# Patient Record
Sex: FEMALE | Race: WHITE | URBAN - METROPOLITAN AREA
[De-identification: names, ages, dates, MRNs, and addresses within clinical notes are randomized per-mention and may not be internally consistent; named-entity substitution may affect disease eponyms.]

---

## 2018-08-07 ENCOUNTER — APPOINTMENT (RX ONLY)
Dept: URBAN - METROPOLITAN AREA CLINIC 31 | Facility: CLINIC | Age: 28
Setting detail: DERMATOLOGY
End: 2018-08-07

## 2018-08-07 DIAGNOSIS — L98.1 FACTITIAL DERMATITIS: ICD-10-CM

## 2018-08-07 DIAGNOSIS — L81.0 POSTINFLAMMATORY HYPERPIGMENTATION: ICD-10-CM

## 2018-08-07 DIAGNOSIS — L28.0 LICHEN SIMPLEX CHRONICUS: ICD-10-CM

## 2018-08-07 PROBLEM — L30.9 DERMATITIS, UNSPECIFIED: Status: ACTIVE | Noted: 2018-08-07

## 2018-08-07 PROBLEM — F41.9 ANXIETY DISORDER, UNSPECIFIED: Status: ACTIVE | Noted: 2018-08-07

## 2018-08-07 PROBLEM — R23.3 SPONTANEOUS ECCHYMOSES: Status: ACTIVE | Noted: 2018-08-07

## 2018-08-07 PROCEDURE — ? COUNSELING

## 2018-08-07 PROCEDURE — ? PRESCRIPTION

## 2018-08-07 PROCEDURE — 11100: CPT

## 2018-08-07 PROCEDURE — ? BIOPSY BY SHAVE METHOD

## 2018-08-07 PROCEDURE — ? TREATMENT REGIMEN

## 2018-08-07 PROCEDURE — 99202 OFFICE O/P NEW SF 15 MIN: CPT | Mod: 25

## 2018-08-07 RX ORDER — CLOBETASOL PROPIONATE 0.5 MG/G
OINTMENT TOPICAL
Qty: 1 | Refills: 0 | Status: ERX | COMMUNITY
Start: 2018-08-07

## 2018-08-07 RX ADMIN — CLOBETASOL PROPIONATE: 0.5 OINTMENT TOPICAL at 18:17

## 2018-08-07 ASSESSMENT — LOCATION SIMPLE DESCRIPTION DERM
LOCATION SIMPLE: LEFT PRETIBIAL REGION
LOCATION SIMPLE: RIGHT PRETIBIAL REGION

## 2018-08-07 ASSESSMENT — LOCATION ZONE DERM: LOCATION ZONE: LEG

## 2018-08-07 ASSESSMENT — LOCATION DETAILED DESCRIPTION DERM
LOCATION DETAILED: LEFT PROXIMAL PRETIBIAL REGION
LOCATION DETAILED: RIGHT PROXIMAL PRETIBIAL REGION
LOCATION DETAILED: LEFT DISTAL PRETIBIAL REGION

## 2018-08-07 NOTE — PROCEDURE: BIOPSY BY SHAVE METHOD
Cryotherapy Text: The wound bed was treated with cryotherapy after the biopsy was performed.
Anesthesia Type: 1% lidocaine with epinephrine
Wound Care: Petrolatum
Lab: -20
Dressing: bandage
Destruction After The Procedure: No
Biopsy Type: H and E
Type Of Destruction Used: Curettage
Hemostasis: Drysol
Biopsy Method: double edge Personna blade
Additional Anesthesia Volume In Cc (Will Not Render If 0): 0
Depth Of Biopsy: dermis
Post-Care Instructions: I reviewed with the patient in detail post-care instructions. Patient is to keep the biopsy covered and apply vaseline twice daily until healed.
Electrodesiccation And Curettage Text: The wound bed was treated with electrodesiccation and curettage after the biopsy was performed.
Silver Nitrate Text: The wound bed was treated with silver nitrate after the biopsy was performed.
Lab Facility: [id number]
Billing Type: Third-Party Bill
Detail Level: Detailed
Curettage Text: The wound bed was treated with curettage after the biopsy was performed.
Was A Bandage Applied: Yes
Consent: Written consent was obtained and risks were reviewed including but not limited to scarring, infection, bleeding, scabbing, incomplete removal, nerve damage and allergy to anesthesia.
Notification Instructions: Patient will be notified of biopsy results. However, patient instructed to call the office if not contacted within 2 weeks.
Electrodesiccation Text: The wound bed was treated with electrodesiccation after the biopsy was performed.
Anesthesia Volume In Cc (Will Not Render If 0): 0.3

## 2018-08-07 NOTE — HPI: EVALUATION OF SKIN LESION(S)
Hpi Title: Evaluation of Skin Lesions
How Severe Are Your Spot(S)?: moderate
Have Your Spot(S) Been Treated In The Past?: has not been treated
Hpi Title: Evaluation of a Skin Lesion
How Severe Are Your Spot(S)?: mild

## 2018-11-14 ENCOUNTER — APPOINTMENT (RX ONLY)
Dept: URBAN - METROPOLITAN AREA CLINIC 31 | Facility: CLINIC | Age: 28
Setting detail: DERMATOLOGY
End: 2018-11-14

## 2018-11-14 DIAGNOSIS — L28.0 LICHEN SIMPLEX CHRONICUS: ICD-10-CM | Status: RESOLVING

## 2018-11-14 DIAGNOSIS — L43.2 LICHENOID DRUG REACTION: ICD-10-CM | Status: RESOLVING

## 2018-11-14 PROCEDURE — ? PRESCRIPTION

## 2018-11-14 PROCEDURE — 99213 OFFICE O/P EST LOW 20 MIN: CPT

## 2018-11-14 PROCEDURE — ? COUNSELING

## 2018-11-14 RX ORDER — POLYDIMETHYLSILOXANES/SILICON
GEL (GRAM) TOPICAL
Qty: 1 | Refills: 5 | COMMUNITY
Start: 2018-11-14

## 2018-11-14 RX ADMIN — Medication: at 22:56

## 2021-04-01 ENCOUNTER — ROUTINE PRENATAL (OUTPATIENT)
Dept: OBGYN CLINIC | Facility: CLINIC | Age: 31
End: 2021-04-01

## 2021-04-01 VITALS
SYSTOLIC BLOOD PRESSURE: 122 MMHG | BODY MASS INDEX: 35.31 KG/M2 | HEIGHT: 67 IN | DIASTOLIC BLOOD PRESSURE: 74 MMHG | WEIGHT: 225 LBS

## 2021-04-01 DIAGNOSIS — Z36.85 ANTENATAL SCREENING FOR STREPTOCOCCUS B: Primary | ICD-10-CM

## 2021-04-01 PROCEDURE — PNV: Performed by: OBSTETRICS & GYNECOLOGY

## 2021-04-01 NOTE — PROGRESS NOTES
Pt feels well  Had growth U/S done yesterday, results pending    GBS culture done and delivery consent obtained

## 2021-04-03 LAB — GP B STREP DNA SPEC QL NAA+PROBE: NEGATIVE

## 2021-04-08 ENCOUNTER — ROUTINE PRENATAL (OUTPATIENT)
Dept: OBGYN CLINIC | Facility: CLINIC | Age: 31
End: 2021-04-08

## 2021-04-08 VITALS
HEIGHT: 68 IN | DIASTOLIC BLOOD PRESSURE: 70 MMHG | BODY MASS INDEX: 34.71 KG/M2 | SYSTOLIC BLOOD PRESSURE: 110 MMHG | WEIGHT: 229 LBS

## 2021-04-08 DIAGNOSIS — Z3A.37 37 WEEKS GESTATION OF PREGNANCY: Primary | ICD-10-CM

## 2021-04-08 LAB
SL AMB  POCT GLUCOSE, UA: NORMAL
SL AMB POCT URINE PROTEIN: NORMAL

## 2021-04-08 PROCEDURE — PNV: Performed by: OBSTETRICS & GYNECOLOGY

## 2021-04-08 NOTE — PROGRESS NOTES
Routine Prenatal Visit  [address], [address], [facility], [address] [facility]    Assessment/Plan:  Jaspal Escobar is a 31y o  year old  at Unknown who presents for routine prenatal visit  1  37 weeks gestation of pregnancy  -     POCT urine dip    Informed pt of negative GBS results  Subjective:     CC: Prenatal care    Hipolito Talbert is a 31 y o  Beauty Asa female who presents for routine prenatal care at Unknown  Pregnancy ROS: No leakage of fluid, pelvic pain, or vaginal bleeding  Nml fetal movement  The following portions of the patient's history were reviewed and updated as appropriate: allergies, current medications, past family history, past medical history, obstetric history, gynecologic history, past social history, past surgical history and problem list       Objective:  /70   Ht 5' 8" (1 727 m)   Wt 104 kg (229 lb)   LMP 2020 (Exact Date)   BMI 34 82 kg/m²   Pregravid Weight/BMI: Pregravid weight not on file (BMI Could not be calculated)  Current Weight: 104 kg (229 lb)   Total Weight Gain: Not found  Pre- Vitals      Most Recent Value   Prenatal Assessment   Fetal Heart Rate  150   Fundal Height (cm)  38 cm   Movement  Present   Presentation  Vertex   Prenatal Vitals   Blood Pressure  110/70   Weight - Scale  104 kg (229 lb)   Urine Albumin/Glucose   Dilation/Effacement/Station   Cervical Dilation  0   Cervical Effacement  20   Fetal Station  -3   Vaginal Drainage   Draining Fluid  No   Edema           General: Well appearing, no distress  Abdomen: Soft, gravid, nontender  Fundal Height: Appropriate for gestational age  Extremities: Warm and well perfused  Non tender

## 2021-04-15 ENCOUNTER — DOCUMENTATION (OUTPATIENT)
Dept: OBGYN CLINIC | Facility: CLINIC | Age: 31
End: 2021-04-15

## 2021-04-15 ENCOUNTER — ROUTINE PRENATAL (OUTPATIENT)
Dept: OBGYN CLINIC | Facility: CLINIC | Age: 31
End: 2021-04-15
Payer: COMMERCIAL

## 2021-04-15 VITALS
WEIGHT: 230 LBS | SYSTOLIC BLOOD PRESSURE: 114 MMHG | BODY MASS INDEX: 34.86 KG/M2 | HEIGHT: 68 IN | DIASTOLIC BLOOD PRESSURE: 74 MMHG

## 2021-04-15 DIAGNOSIS — E66.09 CLASS 1 OBESITY DUE TO EXCESS CALORIES WITHOUT SERIOUS COMORBIDITY WITH BODY MASS INDEX (BMI) OF 33.0 TO 33.9 IN ADULT: ICD-10-CM

## 2021-04-15 DIAGNOSIS — Z34.93 PRENATAL CARE IN THIRD TRIMESTER: ICD-10-CM

## 2021-04-15 DIAGNOSIS — F41.9 ANXIETY: ICD-10-CM

## 2021-04-15 DIAGNOSIS — Z3A.38 38 WEEKS GESTATION OF PREGNANCY: Primary | ICD-10-CM

## 2021-04-15 LAB
SL AMB  POCT GLUCOSE, UA: NORMAL
SL AMB POCT URINE PROTEIN: NORMAL

## 2021-04-15 PROCEDURE — PNV: Performed by: OBSTETRICS & GYNECOLOGY

## 2021-04-15 NOTE — PROGRESS NOTES
Routine Prenatal Visit  [address], [address], [facility], [address] [facility]    Assessment/Plan:  Yonathan Last is a 31y o  year old  at 38w3d who presents for routine prenatal visit  1  38 weeks gestation of pregnancy  -     POCT urine dip  Cervix 4cm/60/0, favorable for IOL  Pt desires IOL, scheduled for 21  Si/sx of labor reviewed  2  Anxiety    3  Class 1 obesity due to excess calories without serious comorbidity with body mass index (BMI) of 33 0 to 33 9 in adult    4  Prenatal care in third trimester    Si/Sx of labor reviewed      Subjective:     CC: Prenatal care    Garrison Martinez is a 31 y o   female who presents for routine prenatal care at 38w3d  Pregnancy ROS: No leakage of fluid, pelvic pain, or vaginal bleeding  Nml fetal movement  The following portions of the patient's history were reviewed and updated as appropriate: allergies, current medications, past family history, past medical history, obstetric history, gynecologic history, past social history, past surgical history and problem list       Objective:  /74   Ht 5' 8" (1 727 m)   Wt 104 kg (230 lb)   LMP 2020 (Exact Date)   BMI 34 97 kg/m²   Pregravid Weight/BMI: Pregravid weight not on file (BMI Could not be calculated)  Current Weight: 104 kg (230 lb)   Total Weight Gain: Not found  Pre- Vitals      Most Recent Value   Prenatal Assessment   Fetal Heart Rate  140   Fundal Height (cm)  39 cm   Movement  Present   Presentation  Vertex   Prenatal Vitals   Blood Pressure  114/74   Weight - Scale  104 kg (230 lb)   Urine Albumin/Glucose   Dilation/Effacement/Station   Cervical Dilation  4   Cervical Effacement  60   Fetal Station  0   Vaginal Drainage   Draining Fluid  No   Edema           General: Well appearing, no distress  Abdomen: Soft, gravid, nontender  Fundal Height: Appropriate for gestational age  Extremities: Warm and well perfused  Non tender

## 2021-04-16 NOTE — PROGRESS NOTES
4/15/2021 Vaginal delivery, 2nd degree laceration      Precipitous delivery, pt did not get epidural

## 2021-04-21 ENCOUNTER — TELEPHONE (OUTPATIENT)
Dept: OBGYN CLINIC | Facility: CLINIC | Age: 31
End: 2021-04-21

## 2021-04-21 NOTE — TELEPHONE ENCOUNTER
Reviewed patient concerns with Dr Abhishek Massey who recommends patient proceed to ER for evaluation due to severity of pain and limited movement ability  Returned call to patient advised to proceed to ED or urgent care  Patient in agreement   Requested call back with update

## 2021-04-21 NOTE — TELEPHONE ENCOUNTER
Sasha Felipe calling with concerns of pain under right axillae extends straight down to bra strap area  Pain is constant, increased pain with palpation  Unable to lift right arm without increased discomfort  Denies redness, warmth to touch, fever  She is not breast feeding or pumping reporting milk has not come in  Has no breast pain or tenderness  Denies recent covid vaccine  Pain level is 10  Also c/o increasing vaginal discomfort however reports she has been very active and going up and down stairs frequently  No increased vaginal bleeding or discharge  Encouraged to rest when baby sleeps  Limit activity and use of stairs  Continue to monitor vaginal discomfort and c/b with any increase of symptoms  Will review right axillary pain with Dr Maximilian Diaz

## 2021-04-22 NOTE — TELEPHONE ENCOUNTER
Review of [facility] shows pt presented to urgent care - dx with cellulitis vs early abscess on back and under axilla  Started on Abx and recom warm compresses  Please print from [address] and scan to chart

## 2021-05-24 ENCOUNTER — POSTPARTUM VISIT (OUTPATIENT)
Dept: OBGYN CLINIC | Facility: CLINIC | Age: 31
End: 2021-05-24

## 2021-05-24 VITALS
HEIGHT: 66 IN | BODY MASS INDEX: 33.43 KG/M2 | WEIGHT: 208 LBS | DIASTOLIC BLOOD PRESSURE: 58 MMHG | SYSTOLIC BLOOD PRESSURE: 98 MMHG

## 2021-05-24 PROCEDURE — 99024 POSTOP FOLLOW-UP VISIT: CPT | Performed by: OBSTETRICS & GYNECOLOGY

## 2021-05-24 NOTE — PATIENT INSTRUCTIONS
- Okay to start exercising again  Start slowly and increase with time      - No lifting restrictions      - Okay to resume intercourse, be sure to use contraception  It is possible to get pregnant before your first period  - If breast feeding, periods may not return right away

## 2021-05-24 NOTE — PROGRESS NOTES
Routine Post Partum Visit  [address] [facility], [address], [facility], [address] [address]    Assessment/Plan:  Schuyler Wright is a 31y o  year old  who presents for postpartum visit  Routine Postpartum Care  1  Normal postpartum exam  2  Contraception: condoms  3  Depression Screen: PHQ9 - zero  4  Feeding: bottle  5  Psychosocial support: good  6  Cervical cancer screening Up to Date, next due this year  7  Follow up in: 3 months or as needed  Additional Problems:  1  Postpartum care and examination        Next visit: 3 months Wellness    Subjective:     CC: Postpartum visit    Melba Rowe is a 31 y o  y o  female  who presents for a postpartum visit  She is 6 weeks postpartum following a spontaneous vaginal delivery on 4/15/2021 at 38 weeks  Outcome: spontaneous vaginal delivery, 2nd degree  Anesthesia: none - too quick for epidural   Postpartum course has been complicated by right axilla cellulitis 1 week post partum for which she was treated w/ Abx  Baby's course has been uncomplciated  Baby is feeding by bottle  Bleeding no bleeding currently - bleeding had stopped and then last week likely a period  Bowel function is normal  Bladder function is normal  Patient is not sexually active since delivery  Contraception method is condoms  Postpartum depression screening: negative      The following portions of the patient's history were reviewed and updated as appropriate: allergies, current medications, past family history, past medical history, obstetric history, gynecologic history, past social history, past surgical history and problem list       Objective:  BP 98/58 (BP Location: Left arm, Patient Position: Sitting, Cuff Size: Large)   Ht 5' 5 75" (1 67 m)   Wt 94 3 kg (208 lb)   LMP 2020 (Exact Date)   Breastfeeding No   BMI 33 83 kg/m²   Pregravid Weight/BMI: Pregravid weight not on file (BMI Could not be calculated)  Current Weight: 94 3 kg (208 lb)   Total Weight Gain: Not found  General: Well appearing, no distress  Mood and affect: Appropriate    Abdomen: Soft, nontender  Incision: well healed  Vulva: normal  Vagina: normal, no abnormal discharge  Cervix: closed, no bleeding  Uterus: non-tender, normal size  Adnexa: no masses, no tenderness

## 2021-10-04 ENCOUNTER — ANNUAL EXAM (OUTPATIENT)
Dept: OBGYN CLINIC | Facility: CLINIC | Age: 31
End: 2021-10-04
Payer: COMMERCIAL

## 2021-10-04 VITALS
HEIGHT: 66 IN | DIASTOLIC BLOOD PRESSURE: 60 MMHG | WEIGHT: 200.2 LBS | BODY MASS INDEX: 32.17 KG/M2 | SYSTOLIC BLOOD PRESSURE: 102 MMHG

## 2021-10-04 DIAGNOSIS — Z30.09 ENCOUNTER FOR COUNSELING REGARDING CONTRACEPTION: ICD-10-CM

## 2021-10-04 DIAGNOSIS — F41.9 ANXIETY: ICD-10-CM

## 2021-10-04 DIAGNOSIS — Z01.419 ENCOUNTER FOR ANNUAL ROUTINE GYNECOLOGICAL EXAMINATION: Primary | ICD-10-CM

## 2021-10-04 DIAGNOSIS — Z12.4 CERVICAL CANCER SCREENING: ICD-10-CM

## 2021-10-04 PROCEDURE — S0612 ANNUAL GYNECOLOGICAL EXAMINA: HCPCS | Performed by: OBSTETRICS & GYNECOLOGY

## 2021-10-07 LAB
CYTOLOGIST CVX/VAG CYTO: NORMAL
DX ICD CODE: NORMAL
OTHER STN SPEC: NORMAL
OTHER STN SPEC: NORMAL
PATH REPORT.FINAL DX SPEC: NORMAL
SL AMB NOTE:: NORMAL
SL AMB SPECIMEN ADEQUACY: NORMAL
SL AMB TEST METHODOLOGY: NORMAL

## 2021-10-11 ENCOUNTER — TELEPHONE (OUTPATIENT)
Dept: OBGYN CLINIC | Facility: CLINIC | Age: 31
End: 2021-10-11

## 2022-03-21 ENCOUNTER — TELEPHONE (OUTPATIENT)
Dept: OBGYN CLINIC | Facility: CLINIC | Age: 32
End: 2022-03-21

## 2022-03-21 DIAGNOSIS — F41.9 ANXIETY: Primary | ICD-10-CM

## 2022-03-21 NOTE — TELEPHONE ENCOUNTER
Return call to Jon Taylor, she started the zoloft in February  Currently has 25 mg tablets, takes 2 daily  Her current bottle indicates partial refill available  She had forgotten about scheduling a follow up     to c/b to schedule appt with Dr Larisa Watson

## 2022-03-21 NOTE — TELEPHONE ENCOUNTER
Patient l/m 03/17/22 @ 2:29 pm (office had phone issues and did not receive the voice message on nurse's line until 03/18/22 after 4 pm) requesting refill of Zoloft  Previous office note from Dr Mora Taylor states patient received a script for Zoloft post partum but did not start but she felt more anxious now and will start the Zoloft 10/04/21 and supposed to f/u in 1 month (she never followed-up)

## 2022-03-31 RX ORDER — SERTRALINE HYDROCHLORIDE 25 MG/1
50 TABLET, FILM COATED ORAL DAILY
Qty: 60 TABLET | Refills: 0 | Status: SHIPPED | OUTPATIENT
Start: 2022-03-31 | End: 2022-04-27 | Stop reason: SDUPTHER

## 2022-03-31 RX ORDER — SERTRALINE HYDROCHLORIDE 25 MG/1
TABLET, FILM COATED ORAL
COMMUNITY
Start: 2022-02-15 | End: 2022-03-31 | Stop reason: SDUPTHER

## 2022-04-04 ENCOUNTER — TELEPHONE (OUTPATIENT)
Dept: OBGYN CLINIC | Facility: CLINIC | Age: 32
End: 2022-04-04

## 2022-04-04 NOTE — TELEPHONE ENCOUNTER
[facility] started a prior authorization on covermymeds  com for Sertraline 25 mg,# 60 pills  Key: [id number]  This nurse finished prior authorization on covermymeds  com  Received a message from [employer], Sertraline was not a covered medication  Spoke with Marissa Winslow as to medication coverage  She states [facility]ist filled Sertraline for 50 mg 1 pill daily instead of taking two 25 mg pills daily  Marissa Winslow states was able to  50 mg medication

## 2022-04-27 ENCOUNTER — OFFICE VISIT (OUTPATIENT)
Dept: OBGYN CLINIC | Facility: CLINIC | Age: 32
End: 2022-04-27
Payer: COMMERCIAL

## 2022-04-27 VITALS — WEIGHT: 189 LBS | DIASTOLIC BLOOD PRESSURE: 66 MMHG | SYSTOLIC BLOOD PRESSURE: 108 MMHG | BODY MASS INDEX: 30.97 KG/M2

## 2022-04-27 DIAGNOSIS — F41.9 ANXIETY: ICD-10-CM

## 2022-04-27 PROCEDURE — 99214 OFFICE O/P EST MOD 30 MIN: CPT | Performed by: OBSTETRICS & GYNECOLOGY

## 2022-04-27 NOTE — ASSESSMENT & PLAN NOTE
Patient with anxiety since delivery  Reports family h/o anxiety as well  At Postpartum visit was offered Zoloft script, but didn't start it  At 10/2021 [facility] visit we discussed again and she was going to start  She was very resistant to taking medication, but eventually started 1/2022  Now on 50mg daily - notes improvement in day to day anxiety and feeling better, but still some anxiety some days and wishes could be better  Discussed increasing dose - she is on very low dose  Will increase to 75mg daily for 2 weeks and can increase then to 100mg daily if she feels needs further  F/u 6 weeks for check in  She agrees

## 2022-04-27 NOTE — PROGRESS NOTES
[address] [facility], [address], [facility], [address]    Assessment/Plan:  1  Anxiety  Assessment & Plan:  Patient with anxiety since delivery  Reports family h/o anxiety as well  At Postpartum visit was offered Zoloft script, but didn't start it  At 10/2021 [facility] visit we discussed again and she was going to start  She was very resistant to taking medication, but eventually started 2022  Now on 50mg daily - notes improvement in day to day anxiety and feeling better, but still some anxiety some days and wishes could be better  Discussed increasing dose - she is on very low dose  Will increase to 75mg daily for 2 weeks and can increase then to 100mg daily if she feels needs further  F/u 6 weeks for check in  She agrees  Orders:  -     sertraline (ZOLOFT) 50 mg tablet; Increase to 1 and 1/2 tablets for 2 weeks, may increase to 2 tablets daily if needed  Next appt - 6 weeks f/u    Subjective:   Bobbi Brantley is a 36 y o   female  CC: felling somewhat better      HPI:   Postpartum was c/o anxiety, was given zoloft script but didn't start  10/4/2021 - [facility] visit- pt feels more anxious, will start now - pt already had script  Pt eventually started 2022  Was really resistant to being on a medication but felt very anxious and was interfering with enjoyment of life and enjoyment of kids  Since starting - feels day to day irritability and anxiety improved, but still feels anxious with specific activities like kids birthdays  No side effects noted so far  Wishes to increase dose  Denies SI/Hi  Gyn History  No LMP recorded  Last pap smear: 10/04/2021    She  reports previously being sexually active and has had partner(s) who are male  She reports using the following method of birth control/protection: Condom  OB History      Past Medical History:  No date:  Anxiety  No date: Obesity  No date: Social anxiety disorder     Past Surgical History:  No date: FRACTURE SURGERY      Comment:  arm   No date: TONSILLECTOMY      Comment:  2001      Social History     Tobacco Use    Smoking status: Never Smoker    Smokeless tobacco: Never Used   Substance Use Topics    Alcohol use: Yes     Comment: special occassions    Drug use: Never          Current Outpatient Medications:     sertraline (ZOLOFT) 50 mg tablet, Increase to 1 and 1/2 tablets for 2 weeks, may increase to 2 tablets daily if needed  , Disp: 60 tablet, Rfl: 1    She has No Known Allergies       ROS: Review of Systems   Constitutional: Negative  Gastrointestinal: Negative  Genitourinary: Negative  Psychiatric/Behavioral: Positive for confusion  Negative for hallucinations, self-injury, sleep disturbance and suicidal ideas  The patient is nervous/anxious  Objective:  /66   Wt 85 7 kg (189 lb)   Breastfeeding No   BMI 30 97 kg/m²      Physical Exam  Constitutional:       Appearance: Normal appearance  Neurological:      Mental Status: She is alert and oriented to person, place, and time     Psychiatric:         Behavior: Behavior normal

## 2022-09-23 DIAGNOSIS — F41.9 ANXIETY: ICD-10-CM

## 2022-09-26 NOTE — TELEPHONE ENCOUNTER
Ray returned call  She never increased the dose and has continued with zoloft 50 taking 1 1/2 tablets daily  Did not increase to 2 tablets daily  Due for well annual 10/4/2022  Transferred to reception to schedule annual   Dr Lesley Lynn, do you want to see her prior to annual for pill check or provide script until visit?

## 2022-09-26 NOTE — TELEPHONE ENCOUNTER
Called and left v/m for patient to call back to inquire about medication usage and if appt is needed per Dr Kinsey Rodrigez

## 2022-11-17 ENCOUNTER — TELEPHONE (OUTPATIENT)
Dept: OBGYN CLINIC | Facility: CLINIC | Age: 32
End: 2022-11-17

## 2022-11-17 DIAGNOSIS — F41.9 ANXIETY: ICD-10-CM

## 2022-11-17 NOTE — TELEPHONE ENCOUNTER
Patient only has one day left of rx, appt for [facility] next week 11/23/22  Request refill until appt

## 2023-02-08 ENCOUNTER — ANNUAL EXAM (OUTPATIENT)
Dept: OBGYN CLINIC | Facility: CLINIC | Age: 33
End: 2023-02-08

## 2023-02-08 VITALS
WEIGHT: 189 LBS | BODY MASS INDEX: 29.66 KG/M2 | DIASTOLIC BLOOD PRESSURE: 70 MMHG | SYSTOLIC BLOOD PRESSURE: 110 MMHG | HEIGHT: 67 IN

## 2023-02-08 DIAGNOSIS — Z12.4 CERVICAL CANCER SCREENING: ICD-10-CM

## 2023-02-08 DIAGNOSIS — F41.9 ANXIETY: ICD-10-CM

## 2023-02-08 DIAGNOSIS — Z01.419 ENCOUNTER FOR ANNUAL ROUTINE GYNECOLOGICAL EXAMINATION: Primary | ICD-10-CM

## 2023-02-08 RX ORDER — SERTRALINE HYDROCHLORIDE 100 MG/1
100 TABLET, FILM COATED ORAL DAILY
Qty: 30 TABLET | Refills: 1 | Status: SHIPPED | OUTPATIENT
Start: 2023-02-08

## 2023-02-08 NOTE — ASSESSMENT & PLAN NOTE
Still some anxiety and depression  Has been on Zoloft 75mg daily since last April  Feels symptoms not worse but not better  Was previously worried about going to 100mg and having side effects  No SE at 75mg so now willing to increase to 100mg  Script to pharmacy - f/u 6 weeks  Also encouraged therapy

## 2023-02-08 NOTE — PROGRESS NOTES
Annual Wellness Visit  [address], [address], [facility], [address] [facility]    ASSESSMENT/PLAN: Robert Robb is a 29 y o  [id number] who presents for annual gynecologic exam     Encounter for routine gynecologic examination  - Routine well woman exam completed today  - Cervical Cancer Screening: Current ASCCP Guidelines reviewed  Last Pap: 10/04/2021 normal  Next Pap Due: today, routine   - HPV Vaccination status: Not immunized  - STI screening offered including HIV testing: offered, pt declined  - Contraceptive counseling discussed  Current contraception: condoms,   - The following were reviewed in today's visit: exercise and healthy diet    Additional problems addressed during this visit:  1  Encounter for annual routine gynecological examination    2  Cervical cancer screening  -     IGP, Aptima HPV, Rfx 16/18,45    3  Anxiety  Assessment & Plan:  Still some anxiety and depression  Has been on Zoloft 75mg daily since last April  Feels symptoms not worse but not better  Was previously worried about going to 100mg and having side effects  No SE at 75mg so now willing to increase to 100mg  Script to pharmacy - f/u 6 weeks  Also encouraged therapy  Orders:  -     sertraline (ZOLOFT) 100 mg tablet; Take 1 tablet (100 mg total) by mouth daily      Next visit: 6 weeks - medication follow up; 1 year Wellness      CC:  Annual Gynecologic Examination    HPI: Rboert Robb is a 29 y o  [id number] who presents for annual gynecologic examination  She denies any breast, urinary or pelvic issues at today's visit  Regular monthly periods  No issues  Sexually active, no new partners  Condoms currently   considering vasectomy  Feels maybe should increase Zoloft dosing a bit  Has been on 75mg for 10 months  No worsening but still some anxiety and depression at times  Gyn History  Patient's last menstrual period was 01/27/2023       Last Pap: 10/04/2021 was normal    She reports that she is not currently sexually active and has had partner(s) who are male  She reports using the following methods of birth control/protection: Abstinence and Condom Male  OB History  [id number]    Past Medical History:  2019: Anemia      Comment:  After first pregnancy  No date: Anxiety and depression  No date: Obesity  No date: Social anxiety disorder     Past Surgical History:  No date: FRACTURE SURGERY      Comment:  arm   No date: TONSILLECTOMY      Comment:  2001      Family History   Problem Relation Age of Onset   • Breast cancer Mother         Dx at 22 at time of breast reduction; reports no further teratment beyond reduction; no known genetic testing   • Cervical cancer Mother    • Cancer Mother         Cervical   • Diabetes Mother    • Cancer Father         Esophageal   • Breast cancer Family    • Colon cancer Neg Hx    • Ovarian cancer Neg Hx         Social History     Tobacco Use   • Smoking status: Never   • Smokeless tobacco: Never   Vaping Use   • Vaping Use: Never used   Substance Use Topics   • Alcohol use: Not Currently     Comment: special occassions   • Drug use: Never          Current Outpatient Medications:   •  sertraline (ZOLOFT) 100 mg tablet, Take 1 tablet (100 mg total) by mouth daily, Disp: 30 tablet, Rfl: 1    She has No Known Allergies       ROS negative except as noted in HPI    Objective:  /70   Ht 5' 6 5" (1 689 m)   Wt 85 7 kg (189 lb)   LMP 01/27/2023   BMI 30 05 kg/m²      Physical Exam  Constitutional:       Appearance: Normal appearance  Chest:   Breasts:     Right: Normal  No mass or tenderness  Left: Normal  No mass or tenderness  Abdominal:      Palpations: Abdomen is soft  Tenderness: There is no abdominal tenderness  Genitourinary:     General: Normal vulva  Vagina: No bleeding or lesions  Cervix: Normal       Uterus: Normal  Not tender  Adnexa:         Right: No mass or tenderness            Left: No mass or tenderness  Rectum: No external hemorrhoid  Musculoskeletal:         General: Normal range of motion  Lymphadenopathy:      Upper Body:      Right upper body: No axillary adenopathy  Left upper body: No axillary adenopathy  Neurological:      Mental Status: She is alert and oriented to person, place, and time     Psychiatric:         Mood and Affect: Mood normal          Behavior: Behavior normal

## 2023-02-13 LAB
CYTOLOGIST CVX/VAG CYTO: NORMAL
DX ICD CODE: NORMAL
HPV GENOTYPE REFLEX: NORMAL
HPV I/H RISK 4 DNA CVX QL PROBE+SIG AMP: NEGATIVE
OTHER STN SPEC: NORMAL
PATH REPORT.FINAL DX SPEC: NORMAL
SL AMB NOTE:: NORMAL
SL AMB SPECIMEN ADEQUACY: NORMAL
SL AMB TEST METHODOLOGY: NORMAL

## 2023-05-31 ENCOUNTER — OFFICE VISIT (OUTPATIENT)
Dept: OBGYN CLINIC | Facility: CLINIC | Age: 33
End: 2023-05-31

## 2023-05-31 VITALS — BODY MASS INDEX: 31.32 KG/M2 | WEIGHT: 197 LBS | DIASTOLIC BLOOD PRESSURE: 68 MMHG | SYSTOLIC BLOOD PRESSURE: 108 MMHG

## 2023-05-31 DIAGNOSIS — Z86.2 HISTORY OF ANEMIA: ICD-10-CM

## 2023-05-31 DIAGNOSIS — Z80.3 FAMILY HISTORY OF BREAST CANCER IN MOTHER: ICD-10-CM

## 2023-05-31 DIAGNOSIS — Z83.49 FAMILY HISTORY OF THYROID DISEASE: ICD-10-CM

## 2023-05-31 DIAGNOSIS — F41.9 ANXIETY: Primary | ICD-10-CM

## 2023-05-31 RX ORDER — SERTRALINE HYDROCHLORIDE 100 MG/1
100 TABLET, FILM COATED ORAL DAILY
Qty: 90 TABLET | Refills: 3 | Status: SHIPPED | OUTPATIENT
Start: 2023-05-31

## 2023-05-31 NOTE — ASSESSMENT & PLAN NOTE
Doing well on Zoloft 100mg  Missed a few doses when prescription ran out and definitely noticed a difference  Wishes to continue same dose  Asked about counseling/therapy    States that hasn't worked for her in the past

## 2023-05-31 NOTE — PROGRESS NOTES
"[address], [address], [facility] [address] [facility]    Assessment/Plan:  Brayan Pope is a 29y o  year old  who presents for follow up medication  1  Anxiety  Assessment & Plan:  Doing well on Zoloft 100mg  Missed a few doses when prescription ran out and definitely noticed a difference  Wishes to continue same dose  Asked about counseling/therapy  States that hasn't worked for her in the past     Orders:  -     sertraline (ZOLOFT) 100 mg tablet; Take 1 tablet (100 mg total) by mouth daily    2  Family history of thyroid disease  Comments:  Reports family h/o thyroid disease  Has not had testing in past and asking for it now  Orders:  -     TSH, 3rd generation with Free T4 reflex; Future  -     TSH, 3rd generation with Free T4 reflex    3  History of anemia  Comments:  States she tried to give blood recently and was told anemic  She did take iron in pregnancy  Will check CBC  Orders:  -     CBC and Platelet; Future  -     CBC and Platelet    4  Family history of breast cancer in mother  Assessment & Plan:  Brayan Pope reports her Mother with breast cancer at time of breast reduction  She isn't sure what age that was but feels \"it was young\"  No known genetic testing she is aware of  Asking about mammograms  Discussed we do recommend mammograms starting 10 years before dx of primary relative, she will check with her mother on details and get back to me  I have spent a total time of 25 minutes on 23 in caring for this patient including Diagnostic results, Risks and benefits of tx options, Instructions for management, Counseling / Coordination of care, Documenting in the medical record, Reviewing / ordering tests, medicine, procedures   and Obtaining or reviewing history    Next Exam: 2024 f/u and Wellness    Subjective:     CC: I have a couple questions    HPI: Tabby Castaneda is a 29 y o  who presents for medication follow up    She feels doing well on currently " Zoloft dose of 100mg  We increased it from 75mg in February  Wishes to continue  She is asking about testing for thyroid due to family history  Also anemia testing because she tried to give blood and couldn't due to anemia  Asking about mammogram screening due to mother history  The following portions of the patient's history were reviewed and updated as appropriate: allergies, current medications, past family history, past medical history, obstetric history, gynecologic history, past social history, past surgical history and problem list     ROS: Review of Systems   Constitutional: Negative  Gastrointestinal: Negative  Genitourinary: Negative  Psychiatric/Behavioral: Negative  Current Outpatient Medications:   •  sertraline (ZOLOFT) 100 mg tablet, Take 1 tablet (100 mg total) by mouth daily, Disp: 90 tablet, Rfl: 3    Objective:  /68   Wt 89 4 kg (197 lb)   LMP 05/24/2023 (Approximate)   BMI 31 32 kg/m²      Physical Exam  Constitutional:       Appearance: Normal appearance  Neurological:      Mental Status: She is alert and oriented to person, place, and time     Psychiatric:         Behavior: Behavior normal

## 2023-05-31 NOTE — ASSESSMENT & PLAN NOTE
"Julia Sanchez reports her Mother with breast cancer at time of breast reduction  She isn't sure what age that was but feels \"it was young\"  No known genetic testing she is aware of  Asking about mammograms  Discussed we do recommend mammograms starting 10 years before dx of primary relative, she will check with her mother on details and get back to me     "

## 2023-05-31 NOTE — PATIENT INSTRUCTIONS
- please send information on Mom's breast cancer     - age at diagnosis     - any other family history breast cancer     - any known genetic testing

## 2023-07-02 LAB
ERYTHROCYTE [DISTWIDTH] IN BLOOD BY AUTOMATED COUNT: 12.9 % (ref 11.7–15.4)
HCT VFR BLD AUTO: 36.7 % (ref 34–46.6)
HGB BLD-MCNC: 12 G/DL (ref 11.1–15.9)
MCH RBC QN AUTO: 28.1 PG (ref 26.6–33)
MCHC RBC AUTO-ENTMCNC: 32.7 G/DL (ref 31.5–35.7)
MCV RBC AUTO: 86 FL (ref 79–97)
PLATELET # BLD AUTO: 292 X10E3/UL (ref 150–450)
RBC # BLD AUTO: 4.27 X10E6/UL (ref 3.77–5.28)
TSH SERPL DL<=0.005 MIU/L-ACNC: 1.31 UIU/ML (ref 0.45–4.5)
WBC # BLD AUTO: 7.4 X10E3/UL (ref 3.4–10.8)

## 2024-11-25 NOTE — PROGRESS NOTES
Assessment/Plan:    Pap every 5 years if normal, exercise most days of week, obtain appropriate diet and hydration, Calcium 1000mg + 600 vit D daily,   FH breast cancer mom at 40  Annual mammogram,  monthly breast self exam.  Find out if mom had genetic testing   Anxiety doing well on Zoloft RX renewed        Diagnoses and all orders for this visit:    Encounter for gynecological examination without abnormal finding    Encounter for screening mammogram for breast cancer  -     Mammo screening bilateral w 3d and cad; Future    Family history of breast cancer in mother    Family history of breast cancer  -     Mammo screening bilateral w 3d and cad; Future    Anxiety  -     sertraline (ZOLOFT) 100 mg tablet; Take 1 tablet (100 mg total) by mouth daily          Subjective:      Patient ID: Neela Odonnell is a 34 y.o. female.    Here for annual gyn Pt of Dr Perez Last [place] 2/2023 H/o anxiety on Zoloft Doing well but has gained a lot of weight on it Periods monthly normal Vasectomy  Denies abd or pelvic pain  Bowel and bladder are  normal FH breast cancer mom premenopause thinks age 40 Unsure if genetic testing done PAP 2/8/2023 neg/neg HPV Works [employer]         The following portions of the patient's history were reviewed and updated as appropriate: allergies, current medications, past family history, past medical history, past social history, past surgical history, and problem list.    Review of Systems   Constitutional:  Negative for fatigue and unexpected weight change.   Gastrointestinal:  Negative for abdominal distention, abdominal pain, constipation and diarrhea.   Genitourinary:  Negative for difficulty urinating, dyspareunia, dysuria, frequency, genital sores, menstrual problem, pelvic pain, urgency, vaginal bleeding, vaginal discharge and vaginal pain.   Neurological:  Negative for headaches.   Psychiatric/Behavioral: Negative.  Negative for dysphoric mood. The patient is not nervous/anxious.       "    Objective:      /72 (BP Location: Left arm, Patient Position: Sitting, Cuff Size: Standard)   Ht 5' 6.5\" (1.689 m)   Wt 98.6 kg (217 lb 6.4 oz)   LMP 11/19/2024   BMI 34.56 kg/m²          Physical Exam  Vitals and nursing note reviewed.   Constitutional:       General: She is not in acute distress.     Appearance: Normal appearance.   HENT:      Head: Normocephalic and atraumatic.   Pulmonary:      Effort: Pulmonary effort is normal.   Chest:   Breasts:     Breasts are symmetrical.      Right: Normal. No mass, nipple discharge, skin change or tenderness.      Left: Normal. No mass, nipple discharge, skin change or tenderness.   Abdominal:      General: There is no distension.      Palpations: Abdomen is soft.      Tenderness: There is no abdominal tenderness. There is no guarding or rebound.   Genitourinary:     General: Normal vulva.      Exam position: Lithotomy position.      Labia:         Right: No rash, tenderness, lesion or injury.         Left: No rash, tenderness, lesion or injury.       Urethra: No prolapse, urethral pain, urethral swelling or urethral lesion.      Vagina: Normal. No erythema or lesions.      Cervix: No cervical motion tenderness, discharge, lesion or cervical bleeding.      Uterus: Normal.       Adnexa: Right adnexa normal and left adnexa normal.        Right: No mass or tenderness.          Left: No mass or tenderness.        Rectum: No mass or external hemorrhoid.   Musculoskeletal:         General: Normal range of motion.   Lymphadenopathy:      Upper Body:      Right upper body: No axillary adenopathy.      Left upper body: No axillary adenopathy.      Lower Body: No right inguinal adenopathy. No left inguinal adenopathy.   Skin:     General: Skin is warm and dry.   Neurological:      Mental Status: She is alert and oriented to person, place, and time.   Psychiatric:         Mood and Affect: Mood normal.         Behavior: Behavior normal.         Thought Content: Thought " content normal.         Judgment: Judgment normal.

## 2024-11-26 ENCOUNTER — ANNUAL EXAM (OUTPATIENT)
Dept: OBGYN CLINIC | Facility: CLINIC | Age: 34
End: 2024-11-26
Payer: COMMERCIAL

## 2024-11-26 VITALS
HEIGHT: 67 IN | SYSTOLIC BLOOD PRESSURE: 118 MMHG | DIASTOLIC BLOOD PRESSURE: 72 MMHG | WEIGHT: 217.4 LBS | BODY MASS INDEX: 34.12 KG/M2

## 2024-11-26 DIAGNOSIS — Z80.3 FAMILY HISTORY OF BREAST CANCER IN MOTHER: ICD-10-CM

## 2024-11-26 DIAGNOSIS — Z01.419 ENCOUNTER FOR GYNECOLOGICAL EXAMINATION WITHOUT ABNORMAL FINDING: Primary | ICD-10-CM

## 2024-11-26 DIAGNOSIS — F41.9 ANXIETY: ICD-10-CM

## 2024-11-26 DIAGNOSIS — Z80.3 FAMILY HISTORY OF BREAST CANCER: ICD-10-CM

## 2024-11-26 DIAGNOSIS — Z12.31 ENCOUNTER FOR SCREENING MAMMOGRAM FOR BREAST CANCER: ICD-10-CM

## 2024-11-26 PROCEDURE — S0612 ANNUAL GYNECOLOGICAL EXAMINA: HCPCS | Performed by: NURSE PRACTITIONER

## 2024-11-26 RX ORDER — SERTRALINE HYDROCHLORIDE 100 MG/1
100 TABLET, FILM COATED ORAL DAILY
Qty: 30 TABLET | Refills: 11 | Status: SHIPPED | OUTPATIENT
Start: 2024-11-26

## 2024-11-26 NOTE — PATIENT INSTRUCTIONS
Pap every 5 years if normal, exercise most days of week, obtain appropriate diet and hydration, Calcium 1000mg + 600 vit D daily,   FH breast cancer mom at 40  Annual mammogram,  monthly breast self exam.  Find out if mom had genetic testing

## 2025-06-27 ENCOUNTER — TELEPHONE (OUTPATIENT)
Age: 35
End: 2025-06-27

## 2025-06-27 NOTE — TELEPHONE ENCOUNTER
Received call from patients  wanting to schedule an appointment for his wife with Dr. España advised him that Dr. España does not do regular derm anymore, patients  declined at this time

## 2025-06-27 NOTE — TELEPHONE ENCOUNTER
Rec'd call from pt's , Jero, requesting to schedule NP appt. Offered cancellations for 9/11 with Dr. Tomas in [place]. Jero declined. [facility] is preferred office. Offered next available there. (2/17/26) Jero declined scheduling as he would like to speak with wife first. He will call back at a later time.